# Patient Record
Sex: MALE | Race: WHITE | NOT HISPANIC OR LATINO
[De-identification: names, ages, dates, MRNs, and addresses within clinical notes are randomized per-mention and may not be internally consistent; named-entity substitution may affect disease eponyms.]

---

## 2018-06-12 PROBLEM — Z00.00 ENCOUNTER FOR PREVENTIVE HEALTH EXAMINATION: Status: ACTIVE | Noted: 2018-06-12

## 2018-06-14 ENCOUNTER — APPOINTMENT (OUTPATIENT)
Dept: SURGERY | Facility: CLINIC | Age: 48
End: 2018-06-14
Payer: COMMERCIAL

## 2018-06-14 VITALS
HEART RATE: 106 BPM | OXYGEN SATURATION: 98 % | HEIGHT: 66 IN | SYSTOLIC BLOOD PRESSURE: 170 MMHG | BODY MASS INDEX: 35.36 KG/M2 | WEIGHT: 220 LBS | DIASTOLIC BLOOD PRESSURE: 100 MMHG

## 2018-06-14 DIAGNOSIS — Z80.52 FAMILY HISTORY OF MALIGNANT NEOPLASM OF BLADDER: ICD-10-CM

## 2018-06-14 DIAGNOSIS — Z85.850 PERSONAL HISTORY OF MALIGNANT NEOPLASM OF THYROID: ICD-10-CM

## 2018-06-14 DIAGNOSIS — Z85.810 PERSONAL HISTORY OF MALIGNANT NEOPLASM OF TONGUE: ICD-10-CM

## 2018-06-14 DIAGNOSIS — Z80.8 FAMILY HISTORY OF MALIGNANT NEOPLASM OF OTHER ORGANS OR SYSTEMS: ICD-10-CM

## 2018-06-14 PROCEDURE — 99202 OFFICE O/P NEW SF 15 MIN: CPT

## 2018-07-03 ENCOUNTER — MESSAGE (OUTPATIENT)
Age: 48
End: 2018-07-03

## 2018-07-03 ENCOUNTER — APPOINTMENT (OUTPATIENT)
Dept: SURGERY | Facility: CLINIC | Age: 48
End: 2018-07-03

## 2019-11-18 ENCOUNTER — EMERGENCY (EMERGENCY)
Facility: HOSPITAL | Age: 49
LOS: 0 days | Discharge: LEFT BEFORE DISCHARGE | End: 2019-11-18
Attending: EMERGENCY MEDICINE | Admitting: EMERGENCY MEDICINE

## 2019-11-18 VITALS
DIASTOLIC BLOOD PRESSURE: 110 MMHG | RESPIRATION RATE: 18 BRPM | OXYGEN SATURATION: 98 % | HEART RATE: 99 BPM | TEMPERATURE: 97 F | SYSTOLIC BLOOD PRESSURE: 202 MMHG

## 2019-11-18 NOTE — ED ADULT TRIAGE NOTE - CHIEF COMPLAINT QUOTE
Pt feeling off balance and dizzy x few days. Pt newly prescribed BP medication, sent in by PMD for further evaluation.

## 2019-11-18 NOTE — ED ADULT NURSE NOTE - OBJECTIVE STATEMENT
Pt states got bit by a tic 2 weeks ago and was thinking "my bp was high due to that" " I saw my pmd and he did blood work due to my high bp and possible high bp from the tic bit. patient states has tingling in face and leg also, states " I do not want to be here all night I will come back tomorrow:

## 2019-11-22 DIAGNOSIS — R42 DIZZINESS AND GIDDINESS: ICD-10-CM

## 2020-08-11 ENCOUNTER — APPOINTMENT (OUTPATIENT)
Dept: SURGERY | Facility: CLINIC | Age: 50
End: 2020-08-11
Payer: COMMERCIAL

## 2020-08-11 VITALS
HEART RATE: 102 BPM | BODY MASS INDEX: 33.43 KG/M2 | HEIGHT: 66 IN | WEIGHT: 208 LBS | OXYGEN SATURATION: 97 % | TEMPERATURE: 97.1 F

## 2020-08-11 DIAGNOSIS — I10 ESSENTIAL (PRIMARY) HYPERTENSION: ICD-10-CM

## 2020-08-11 DIAGNOSIS — R10.31 RIGHT LOWER QUADRANT PAIN: ICD-10-CM

## 2020-08-11 DIAGNOSIS — Z86.39 PERSONAL HISTORY OF OTHER ENDOCRINE, NUTRITIONAL AND METABOLIC DISEASE: ICD-10-CM

## 2020-08-11 DIAGNOSIS — R10.33 PERIUMBILICAL PAIN: ICD-10-CM

## 2020-08-11 DIAGNOSIS — R10.32 LEFT LOWER QUADRANT PAIN: ICD-10-CM

## 2020-08-11 PROCEDURE — 99213 OFFICE O/P EST LOW 20 MIN: CPT

## 2020-08-11 RX ORDER — LEVOTHYROXINE SODIUM 0.05 MG/1
50 TABLET ORAL
Refills: 0 | Status: ACTIVE | COMMUNITY

## 2020-08-11 RX ORDER — LISINOPRIL 30 MG/1
TABLET ORAL
Refills: 0 | Status: ACTIVE | COMMUNITY

## 2020-08-11 RX ORDER — AMOXICILLIN 875 MG/1
875 TABLET, FILM COATED ORAL
Qty: 20 | Refills: 0 | Status: COMPLETED | COMMUNITY
Start: 2018-06-14 | End: 2020-08-11

## 2020-08-11 NOTE — ASSESSMENT
[FreeTextEntry1] : After examination patient was explained about hernia  again. He was explained about needing colonoscopy, patient was also explained about gallstones. Patient is asymptomatic from gallbladder point of view. Surgery for umbilical hernia explain in detail. Risks and  complication explained. Informed consent was obtained. Surgery to be scheduled.

## 2020-08-11 NOTE — PHYSICAL EXAM
[Abdominal Masses] : No abdominal masses [Alert] : alert [Abdomen Tenderness] : ~T ~M Abdominal tenderness [No Rash or Lesion] : No rash or lesion [Oriented to Person] : oriented to person [Oriented to Place] : oriented to place [Oriented to Time] : oriented to time [Calm] : calm [de-identified] : NAD [de-identified] : Nonreducible umbilical hernia which is tender  to touch [de-identified] : Nonreducible umbilical hernia

## 2020-08-11 NOTE — CONSULT LETTER
[Consult Letter:] : I had the pleasure of evaluating your patient, [unfilled]. [Dear  ___] : Dear  [unfilled], [Consult Closing:] : Thank you very much for allowing me to participate in the care of this patient.  If you have any questions, please do not hesitate to contact me. [Please see my note below.] : Please see my note below. [Sincerely,] : Sincerely, [FreeTextEntry3] : Mikel Keith MD, FACS\par [facility]\[place] [address]\par

## 2020-08-11 NOTE — HISTORY OF PRESENT ILLNESS
[de-identified] : Patient presents to the office with the history of symptomatic umbilical hernia. He was seen in the office two years ago and was advised to come back and scheduled the surgery. He has not been in the office for two years. Previous CAT scan report was also explained to the patient however patient does not remember the discussion two years ago.

## 2020-09-04 ENCOUNTER — APPOINTMENT (OUTPATIENT)
Dept: SURGERY | Facility: HOSPITAL | Age: 50
End: 2020-09-04

## 2020-09-10 ENCOUNTER — APPOINTMENT (OUTPATIENT)
Dept: SURGERY | Facility: CLINIC | Age: 50
End: 2020-09-10

## 2020-11-02 ENCOUNTER — OUTPATIENT (OUTPATIENT)
Dept: OUTPATIENT SERVICES | Facility: HOSPITAL | Age: 50
LOS: 1 days | Discharge: HOME | End: 2020-11-02
Payer: COMMERCIAL

## 2020-11-02 ENCOUNTER — RESULT REVIEW (OUTPATIENT)
Age: 50
End: 2020-11-02

## 2020-11-02 VITALS
HEART RATE: 102 BPM | TEMPERATURE: 99 F | RESPIRATION RATE: 20 BRPM | SYSTOLIC BLOOD PRESSURE: 170 MMHG | HEIGHT: 66 IN | OXYGEN SATURATION: 100 % | DIASTOLIC BLOOD PRESSURE: 80 MMHG | WEIGHT: 223.11 LBS

## 2020-11-02 DIAGNOSIS — Z98.890 OTHER SPECIFIED POSTPROCEDURAL STATES: Chronic | ICD-10-CM

## 2020-11-02 DIAGNOSIS — K42.9 UMBILICAL HERNIA WITHOUT OBSTRUCTION OR GANGRENE: ICD-10-CM

## 2020-11-02 DIAGNOSIS — Z01.818 ENCOUNTER FOR OTHER PREPROCEDURAL EXAMINATION: ICD-10-CM

## 2020-11-02 LAB
ALBUMIN SERPL ELPH-MCNC: 4.6 G/DL — SIGNIFICANT CHANGE UP (ref 3.5–5.2)
ALP SERPL-CCNC: 69 U/L — SIGNIFICANT CHANGE UP (ref 30–115)
ALT FLD-CCNC: 21 U/L — SIGNIFICANT CHANGE UP (ref 0–41)
ANION GAP SERPL CALC-SCNC: 13 MMOL/L — SIGNIFICANT CHANGE UP (ref 7–14)
APPEARANCE UR: CLEAR — SIGNIFICANT CHANGE UP
APTT BLD: 36.9 SEC — SIGNIFICANT CHANGE UP (ref 27–39.2)
AST SERPL-CCNC: 16 U/L — SIGNIFICANT CHANGE UP (ref 0–41)
BASOPHILS # BLD AUTO: 0.04 K/UL — SIGNIFICANT CHANGE UP (ref 0–0.2)
BASOPHILS NFR BLD AUTO: 0.6 % — SIGNIFICANT CHANGE UP (ref 0–1)
BILIRUB SERPL-MCNC: 0.2 MG/DL — SIGNIFICANT CHANGE UP (ref 0.2–1.2)
BILIRUB UR-MCNC: NEGATIVE — SIGNIFICANT CHANGE UP
BUN SERPL-MCNC: 15 MG/DL — SIGNIFICANT CHANGE UP (ref 10–20)
CALCIUM SERPL-MCNC: 9.8 MG/DL — SIGNIFICANT CHANGE UP (ref 8.5–10.1)
CHLORIDE SERPL-SCNC: 102 MMOL/L — SIGNIFICANT CHANGE UP (ref 98–110)
CO2 SERPL-SCNC: 27 MMOL/L — SIGNIFICANT CHANGE UP (ref 17–32)
COLOR SPEC: SIGNIFICANT CHANGE UP
CREAT SERPL-MCNC: 1.4 MG/DL — SIGNIFICANT CHANGE UP (ref 0.7–1.5)
DIFF PNL FLD: NEGATIVE — SIGNIFICANT CHANGE UP
EOSINOPHIL # BLD AUTO: 0.14 K/UL — SIGNIFICANT CHANGE UP (ref 0–0.7)
EOSINOPHIL NFR BLD AUTO: 2.2 % — SIGNIFICANT CHANGE UP (ref 0–8)
GLUCOSE SERPL-MCNC: 88 MG/DL — SIGNIFICANT CHANGE UP (ref 70–99)
GLUCOSE UR QL: NEGATIVE — SIGNIFICANT CHANGE UP
HCT VFR BLD CALC: 45.2 % — SIGNIFICANT CHANGE UP (ref 42–52)
HGB BLD-MCNC: 14.4 G/DL — SIGNIFICANT CHANGE UP (ref 14–18)
IMM GRANULOCYTES NFR BLD AUTO: 0.3 % — SIGNIFICANT CHANGE UP (ref 0.1–0.3)
INR BLD: 1.1 RATIO — SIGNIFICANT CHANGE UP (ref 0.65–1.3)
KETONES UR-MCNC: NEGATIVE — SIGNIFICANT CHANGE UP
LEUKOCYTE ESTERASE UR-ACNC: NEGATIVE — SIGNIFICANT CHANGE UP
LYMPHOCYTES # BLD AUTO: 1.28 K/UL — SIGNIFICANT CHANGE UP (ref 1.2–3.4)
LYMPHOCYTES # BLD AUTO: 19.8 % — LOW (ref 20.5–51.1)
MCHC RBC-ENTMCNC: 29.4 PG — SIGNIFICANT CHANGE UP (ref 27–31)
MCHC RBC-ENTMCNC: 31.9 G/DL — LOW (ref 32–37)
MCV RBC AUTO: 92.4 FL — SIGNIFICANT CHANGE UP (ref 80–94)
MONOCYTES # BLD AUTO: 0.54 K/UL — SIGNIFICANT CHANGE UP (ref 0.1–0.6)
MONOCYTES NFR BLD AUTO: 8.3 % — SIGNIFICANT CHANGE UP (ref 1.7–9.3)
NEUTROPHILS # BLD AUTO: 4.46 K/UL — SIGNIFICANT CHANGE UP (ref 1.4–6.5)
NEUTROPHILS NFR BLD AUTO: 68.8 % — SIGNIFICANT CHANGE UP (ref 42.2–75.2)
NITRITE UR-MCNC: NEGATIVE — SIGNIFICANT CHANGE UP
NRBC # BLD: 0 /100 WBCS — SIGNIFICANT CHANGE UP (ref 0–0)
PH UR: 6 — SIGNIFICANT CHANGE UP (ref 5–8)
PLATELET # BLD AUTO: 275 K/UL — SIGNIFICANT CHANGE UP (ref 130–400)
POTASSIUM SERPL-MCNC: 4.7 MMOL/L — SIGNIFICANT CHANGE UP (ref 3.5–5)
POTASSIUM SERPL-SCNC: 4.7 MMOL/L — SIGNIFICANT CHANGE UP (ref 3.5–5)
PROT SERPL-MCNC: 7 G/DL — SIGNIFICANT CHANGE UP (ref 6–8)
PROT UR-MCNC: SIGNIFICANT CHANGE UP
PROTHROM AB SERPL-ACNC: 12.6 SEC — SIGNIFICANT CHANGE UP (ref 9.95–12.87)
RBC # BLD: 4.89 M/UL — SIGNIFICANT CHANGE UP (ref 4.7–6.1)
RBC # FLD: 12.6 % — SIGNIFICANT CHANGE UP (ref 11.5–14.5)
SODIUM SERPL-SCNC: 142 MMOL/L — SIGNIFICANT CHANGE UP (ref 135–146)
SP GR SPEC: 1.02 — SIGNIFICANT CHANGE UP (ref 1.01–1.03)
UROBILINOGEN FLD QL: SIGNIFICANT CHANGE UP
WBC # BLD: 6.48 K/UL — SIGNIFICANT CHANGE UP (ref 4.8–10.8)
WBC # FLD AUTO: 6.48 K/UL — SIGNIFICANT CHANGE UP (ref 4.8–10.8)

## 2020-11-02 PROCEDURE — 93010 ELECTROCARDIOGRAM REPORT: CPT

## 2020-11-02 PROCEDURE — 71046 X-RAY EXAM CHEST 2 VIEWS: CPT | Mod: 26

## 2020-11-02 NOTE — H&P PST ADULT - NSICDXPASTMEDICALHX_GEN_ALL_CORE_FT
PAST MEDICAL HISTORY:  H/O cerebral aneurysm repair     HTN (hypertension)     Hypothyroidism     Thyroid cancer     Tongue cancer      PAST MEDICAL HISTORY:  H/O cerebral aneurysm repair 2019    HTN (hypertension)     Hypothyroidism     Thyroid cancer 2015    Tongue cancer 2015( last chemo/ radiation 2015)

## 2020-11-02 NOTE — H&P PST ADULT - HISTORY OF PRESENT ILLNESS
51 y/o male scheduled for umbilical hernia repair with mesh  pt reports that hernia has been there for aprox 20 yrs has grown is size and is uncomfortable  reports no c/o cp,sob,palpitations,cough or dysuria  1-2 fos without sob      denies any exposure to COVID-19 advised to self quarantine until after surg    Anesthesia Alert  NO--Difficult Airway  NO--History of neck surgery or radiation  NO--Limited ROM of neck  NO--History of Malignant hyperthermia  NO--Personal or family history of Pseudocholinesterase deficiency  NO--Prior Anesthesia Complication  NO--Latex Allergy  NO--Loose teeth  NO--History of Rheumatoid Arthritis  NO--SOY  NO--Other_____

## 2020-11-08 ENCOUNTER — OUTPATIENT (OUTPATIENT)
Dept: OUTPATIENT SERVICES | Facility: HOSPITAL | Age: 50
LOS: 1 days | Discharge: HOME | End: 2020-11-08

## 2020-11-08 ENCOUNTER — LABORATORY RESULT (OUTPATIENT)
Age: 50
End: 2020-11-08

## 2020-11-08 DIAGNOSIS — Z98.890 OTHER SPECIFIED POSTPROCEDURAL STATES: Chronic | ICD-10-CM

## 2020-11-08 DIAGNOSIS — Z11.59 ENCOUNTER FOR SCREENING FOR OTHER VIRAL DISEASES: ICD-10-CM

## 2020-11-08 PROBLEM — C02.9 MALIGNANT NEOPLASM OF TONGUE, UNSPECIFIED: Chronic | Status: ACTIVE | Noted: 2020-11-02

## 2020-11-08 PROBLEM — I10 ESSENTIAL (PRIMARY) HYPERTENSION: Chronic | Status: ACTIVE | Noted: 2020-11-02

## 2020-11-08 PROBLEM — C73 MALIGNANT NEOPLASM OF THYROID GLAND: Chronic | Status: ACTIVE | Noted: 2020-11-02

## 2020-11-08 PROBLEM — E03.9 HYPOTHYROIDISM, UNSPECIFIED: Chronic | Status: ACTIVE | Noted: 2020-11-02

## 2020-11-10 NOTE — ASU PATIENT PROFILE, ADULT - PMH
H/O cerebral aneurysm repair  2019  HTN (hypertension)    Hypothyroidism    Thyroid cancer  2015  Tongue cancer  2015( last chemo/ radiation 2015)

## 2020-11-10 NOTE — ASU PATIENT PROFILE, ADULT - PSH
S/P cerebral aneurysm repair     H/O partial thyroidectomy    S/P cerebral aneurysm repair    Surgical history unknown  tumor removal/tongue/cavernoma brain removed

## 2020-11-11 ENCOUNTER — OUTPATIENT (OUTPATIENT)
Dept: OUTPATIENT SERVICES | Facility: HOSPITAL | Age: 50
LOS: 1 days | Discharge: HOME | End: 2020-11-11
Payer: COMMERCIAL

## 2020-11-11 ENCOUNTER — APPOINTMENT (OUTPATIENT)
Dept: SURGERY | Facility: HOSPITAL | Age: 50
End: 2020-11-11

## 2020-11-11 VITALS
HEART RATE: 108 BPM | RESPIRATION RATE: 18 BRPM | DIASTOLIC BLOOD PRESSURE: 109 MMHG | WEIGHT: 218.04 LBS | TEMPERATURE: 99 F | OXYGEN SATURATION: 97 % | HEIGHT: 66 IN | SYSTOLIC BLOOD PRESSURE: 148 MMHG

## 2020-11-11 VITALS — RESPIRATION RATE: 16 BRPM | HEART RATE: 86 BPM | DIASTOLIC BLOOD PRESSURE: 78 MMHG | SYSTOLIC BLOOD PRESSURE: 121 MMHG

## 2020-11-11 DIAGNOSIS — Z98.890 OTHER SPECIFIED POSTPROCEDURAL STATES: Chronic | ICD-10-CM

## 2020-11-11 DIAGNOSIS — Z90.09 ACQUIRED ABSENCE OF OTHER PART OF HEAD AND NECK: Chronic | ICD-10-CM

## 2020-11-11 DIAGNOSIS — Z78.9 OTHER SPECIFIED HEALTH STATUS: Chronic | ICD-10-CM

## 2020-11-11 PROCEDURE — 49585: CPT

## 2020-11-11 RX ORDER — LABETALOL HCL 100 MG
1 TABLET ORAL
Qty: 0 | Refills: 0 | DISCHARGE

## 2020-11-11 RX ORDER — HYDROMORPHONE HYDROCHLORIDE 2 MG/ML
1 INJECTION INTRAMUSCULAR; INTRAVENOUS; SUBCUTANEOUS
Refills: 0 | Status: DISCONTINUED | OUTPATIENT
Start: 2020-11-11 | End: 2020-11-11

## 2020-11-11 RX ORDER — HYDROMORPHONE HYDROCHLORIDE 2 MG/ML
0.5 INJECTION INTRAMUSCULAR; INTRAVENOUS; SUBCUTANEOUS
Refills: 0 | Status: DISCONTINUED | OUTPATIENT
Start: 2020-11-11 | End: 2020-11-11

## 2020-11-11 RX ORDER — ONDANSETRON 8 MG/1
4 TABLET, FILM COATED ORAL ONCE
Refills: 0 | Status: DISCONTINUED | OUTPATIENT
Start: 2020-11-11 | End: 2020-11-25

## 2020-11-11 RX ORDER — SODIUM CHLORIDE 9 MG/ML
1000 INJECTION, SOLUTION INTRAVENOUS
Refills: 0 | Status: DISCONTINUED | OUTPATIENT
Start: 2020-11-11 | End: 2020-11-25

## 2020-11-11 RX ORDER — LEVOTHYROXINE SODIUM 125 MCG
1 TABLET ORAL
Qty: 0 | Refills: 0 | DISCHARGE

## 2020-11-11 NOTE — PRE-ANESTHESIA EVALUATION ADULT - NSANTHOSAYNRD_GEN_A_CORE
No. SOY screening performed.  STOP BANG Legend: 0-2 = LOW Risk; 3-4 = INTERMEDIATE Risk; 5-8 = HIGH Risk/denies

## 2020-11-11 NOTE — ASU DISCHARGE PLAN (ADULT/PEDIATRIC) - CARE PROVIDER_API CALL
Mikel Keith  General Surgery  [address] [place] [address]  Phone: [phone]  Fax: [phone]  Follow Up Time: 1 week

## 2020-11-11 NOTE — BRIEF OPERATIVE NOTE - NSICDXBRIEFPROCEDURE_GEN_ALL_CORE_FT
PROCEDURES:  Herniorrhaphy of umbilical hernia in patient 5 years of age or older 11-Nov-2020 14:10:55  Mikel Keith

## 2020-11-16 DIAGNOSIS — K42.9 UMBILICAL HERNIA WITHOUT OBSTRUCTION OR GANGRENE: ICD-10-CM

## 2020-11-16 DIAGNOSIS — Z85.810 PERSONAL HISTORY OF MALIGNANT NEOPLASM OF TONGUE: ICD-10-CM

## 2020-11-16 DIAGNOSIS — I10 ESSENTIAL (PRIMARY) HYPERTENSION: ICD-10-CM

## 2020-11-16 DIAGNOSIS — Z85.850 PERSONAL HISTORY OF MALIGNANT NEOPLASM OF THYROID: ICD-10-CM

## 2020-11-16 DIAGNOSIS — E03.9 HYPOTHYROIDISM, UNSPECIFIED: ICD-10-CM

## 2020-11-19 ENCOUNTER — APPOINTMENT (OUTPATIENT)
Dept: SURGERY | Facility: CLINIC | Age: 50
End: 2020-11-19
Payer: COMMERCIAL

## 2020-11-19 DIAGNOSIS — K42.9 UMBILICAL HERNIA W/OUT OBSTRUCTION OR GANGRENE: ICD-10-CM

## 2020-11-19 PROCEDURE — 99024 POSTOP FOLLOW-UP VISIT: CPT

## 2021-01-14 ENCOUNTER — INPATIENT (INPATIENT)
Facility: HOSPITAL | Age: 51
LOS: 0 days | Discharge: HOME | End: 2021-01-15
Attending: INTERNAL MEDICINE | Admitting: INTERNAL MEDICINE
Payer: COMMERCIAL

## 2021-01-14 VITALS
TEMPERATURE: 99 F | HEIGHT: 66 IN | SYSTOLIC BLOOD PRESSURE: 190 MMHG | WEIGHT: 212.97 LBS | RESPIRATION RATE: 16 BRPM | HEART RATE: 106 BPM | DIASTOLIC BLOOD PRESSURE: 104 MMHG | OXYGEN SATURATION: 97 %

## 2021-01-14 DIAGNOSIS — Z90.09 ACQUIRED ABSENCE OF OTHER PART OF HEAD AND NECK: Chronic | ICD-10-CM

## 2021-01-14 DIAGNOSIS — Z98.890 OTHER SPECIFIED POSTPROCEDURAL STATES: Chronic | ICD-10-CM

## 2021-01-14 DIAGNOSIS — Z78.9 OTHER SPECIFIED HEALTH STATUS: Chronic | ICD-10-CM

## 2021-01-14 LAB
ALBUMIN SERPL ELPH-MCNC: 4.3 G/DL — SIGNIFICANT CHANGE UP (ref 3.5–5.2)
ALP SERPL-CCNC: 101 U/L — SIGNIFICANT CHANGE UP (ref 30–115)
ALT FLD-CCNC: 36 U/L — SIGNIFICANT CHANGE UP (ref 0–41)
ANION GAP SERPL CALC-SCNC: 12 MMOL/L — SIGNIFICANT CHANGE UP (ref 7–14)
APPEARANCE UR: CLEAR — SIGNIFICANT CHANGE UP
AST SERPL-CCNC: 22 U/L — SIGNIFICANT CHANGE UP (ref 0–41)
BACTERIA # UR AUTO: SIGNIFICANT CHANGE UP /HPF
BASOPHILS # BLD AUTO: 0.04 K/UL — SIGNIFICANT CHANGE UP (ref 0–0.2)
BASOPHILS NFR BLD AUTO: 0.4 % — SIGNIFICANT CHANGE UP (ref 0–1)
BILIRUB DIRECT SERPL-MCNC: <0.2 MG/DL — SIGNIFICANT CHANGE UP (ref 0–0.2)
BILIRUB INDIRECT FLD-MCNC: >0.1 MG/DL — LOW (ref 0.2–1.2)
BILIRUB SERPL-MCNC: 0.3 MG/DL — SIGNIFICANT CHANGE UP (ref 0.2–1.2)
BILIRUB UR-MCNC: NEGATIVE — SIGNIFICANT CHANGE UP
BUN SERPL-MCNC: 29 MG/DL — HIGH (ref 10–20)
CALCIUM SERPL-MCNC: 9.1 MG/DL — SIGNIFICANT CHANGE UP (ref 8.5–10.1)
CHLORIDE SERPL-SCNC: 101 MMOL/L — SIGNIFICANT CHANGE UP (ref 98–110)
CO2 SERPL-SCNC: 25 MMOL/L — SIGNIFICANT CHANGE UP (ref 17–32)
COLOR SPEC: YELLOW — SIGNIFICANT CHANGE UP
CREAT SERPL-MCNC: 2.3 MG/DL — HIGH (ref 0.7–1.5)
DIFF PNL FLD: ABNORMAL
EOSINOPHIL # BLD AUTO: 0.16 K/UL — SIGNIFICANT CHANGE UP (ref 0–0.7)
EOSINOPHIL NFR BLD AUTO: 1.7 % — SIGNIFICANT CHANGE UP (ref 0–8)
EPI CELLS # UR: ABNORMAL /HPF
GLUCOSE SERPL-MCNC: 124 MG/DL — HIGH (ref 70–99)
GLUCOSE UR QL: NEGATIVE MG/DL — SIGNIFICANT CHANGE UP
HCT VFR BLD CALC: 41.8 % — LOW (ref 42–52)
HGB BLD-MCNC: 13.6 G/DL — LOW (ref 14–18)
IMM GRANULOCYTES NFR BLD AUTO: 0.4 % — HIGH (ref 0.1–0.3)
KETONES UR-MCNC: NEGATIVE — SIGNIFICANT CHANGE UP
LACTATE SERPL-SCNC: 0.6 MMOL/L — LOW (ref 0.7–2)
LEUKOCYTE ESTERASE UR-ACNC: NEGATIVE — SIGNIFICANT CHANGE UP
LIDOCAIN IGE QN: 47 U/L — SIGNIFICANT CHANGE UP (ref 7–60)
LYMPHOCYTES # BLD AUTO: 1.01 K/UL — LOW (ref 1.2–3.4)
LYMPHOCYTES # BLD AUTO: 10.8 % — LOW (ref 20.5–51.1)
MCHC RBC-ENTMCNC: 28.6 PG — SIGNIFICANT CHANGE UP (ref 27–31)
MCHC RBC-ENTMCNC: 32.5 G/DL — SIGNIFICANT CHANGE UP (ref 32–37)
MCV RBC AUTO: 87.8 FL — SIGNIFICANT CHANGE UP (ref 80–94)
MONOCYTES # BLD AUTO: 1.08 K/UL — HIGH (ref 0.1–0.6)
MONOCYTES NFR BLD AUTO: 11.5 % — HIGH (ref 1.7–9.3)
NEUTROPHILS # BLD AUTO: 7.03 K/UL — HIGH (ref 1.4–6.5)
NEUTROPHILS NFR BLD AUTO: 75.2 % — SIGNIFICANT CHANGE UP (ref 42.2–75.2)
NITRITE UR-MCNC: NEGATIVE — SIGNIFICANT CHANGE UP
NRBC # BLD: 0 /100 WBCS — SIGNIFICANT CHANGE UP (ref 0–0)
PH UR: 5.5 — SIGNIFICANT CHANGE UP (ref 5–8)
PLATELET # BLD AUTO: 314 K/UL — SIGNIFICANT CHANGE UP (ref 130–400)
POTASSIUM SERPL-MCNC: 4.2 MMOL/L — SIGNIFICANT CHANGE UP (ref 3.5–5)
POTASSIUM SERPL-SCNC: 4.2 MMOL/L — SIGNIFICANT CHANGE UP (ref 3.5–5)
PROT SERPL-MCNC: 6.8 G/DL — SIGNIFICANT CHANGE UP (ref 6–8)
PROT UR-MCNC: 100 MG/DL
RBC # BLD: 4.76 M/UL — SIGNIFICANT CHANGE UP (ref 4.7–6.1)
RBC # FLD: 12 % — SIGNIFICANT CHANGE UP (ref 11.5–14.5)
RBC CASTS # UR COMP ASSIST: ABNORMAL /HPF
SODIUM SERPL-SCNC: 138 MMOL/L — SIGNIFICANT CHANGE UP (ref 135–146)
SP GR SPEC: >=1.03 (ref 1.01–1.03)
UROBILINOGEN FLD QL: 0.2 MG/DL — SIGNIFICANT CHANGE UP (ref 0.2–0.2)
WBC # BLD: 9.36 K/UL — SIGNIFICANT CHANGE UP (ref 4.8–10.8)
WBC # FLD AUTO: 9.36 K/UL — SIGNIFICANT CHANGE UP (ref 4.8–10.8)
WBC UR QL: SIGNIFICANT CHANGE UP /HPF

## 2021-01-14 PROCEDURE — 99285 EMERGENCY DEPT VISIT HI MDM: CPT

## 2021-01-14 RX ORDER — SODIUM CHLORIDE 9 MG/ML
1000 INJECTION, SOLUTION INTRAVENOUS ONCE
Refills: 0 | Status: COMPLETED | OUTPATIENT
Start: 2021-01-14 | End: 2021-01-14

## 2021-01-14 RX ORDER — ONDANSETRON 8 MG/1
4 TABLET, FILM COATED ORAL ONCE
Refills: 0 | Status: COMPLETED | OUTPATIENT
Start: 2021-01-14 | End: 2021-01-14

## 2021-01-14 RX ADMIN — SODIUM CHLORIDE 1000 MILLILITER(S): 9 INJECTION, SOLUTION INTRAVENOUS at 23:02

## 2021-01-14 RX ADMIN — ONDANSETRON 4 MILLIGRAM(S): 8 TABLET, FILM COATED ORAL at 23:02

## 2021-01-14 NOTE — ED ADULT NURSE NOTE - PSH
H/O partial thyroidectomy    S/P cerebral aneurysm repair    Surgical history unknown  tumor removal/tongue/cavernoma brain removed

## 2021-01-14 NOTE — ED ADULT NURSE NOTE - NSIMPLEMENTINTERV_GEN_ALL_ED
Implemented All Universal Safety Interventions:  [place] to call system. Call bell, personal items and telephone within reach. Instruct patient to call for assistance. Room bathroom lighting operational. Non-slip footwear when patient is off stretcher. Physically safe environment: no spills, clutter or unnecessary equipment. Stretcher in lowest position, wheels locked, appropriate side rails in place.

## 2021-01-15 ENCOUNTER — TRANSCRIPTION ENCOUNTER (OUTPATIENT)
Age: 51
End: 2021-01-15

## 2021-01-15 VITALS
HEART RATE: 78 BPM | DIASTOLIC BLOOD PRESSURE: 97 MMHG | TEMPERATURE: 96 F | RESPIRATION RATE: 16 BRPM | SYSTOLIC BLOOD PRESSURE: 159 MMHG

## 2021-01-15 DIAGNOSIS — N20.0 CALCULUS OF KIDNEY: ICD-10-CM

## 2021-01-15 LAB
ANION GAP SERPL CALC-SCNC: 10 MMOL/L — SIGNIFICANT CHANGE UP (ref 7–14)
ANION GAP SERPL CALC-SCNC: 10 MMOL/L — SIGNIFICANT CHANGE UP (ref 7–14)
APTT BLD: 34.7 SEC — SIGNIFICANT CHANGE UP (ref 27–39.2)
BUN SERPL-MCNC: 23 MG/DL — HIGH (ref 10–20)
BUN SERPL-MCNC: 27 MG/DL — HIGH (ref 10–20)
CALCIUM SERPL-MCNC: 8.8 MG/DL — SIGNIFICANT CHANGE UP (ref 8.5–10.1)
CALCIUM SERPL-MCNC: 9.1 MG/DL — SIGNIFICANT CHANGE UP (ref 8.5–10.1)
CHLORIDE SERPL-SCNC: 101 MMOL/L — SIGNIFICANT CHANGE UP (ref 98–110)
CHLORIDE SERPL-SCNC: 103 MMOL/L — SIGNIFICANT CHANGE UP (ref 98–110)
CO2 SERPL-SCNC: 25 MMOL/L — SIGNIFICANT CHANGE UP (ref 17–32)
CO2 SERPL-SCNC: 26 MMOL/L — SIGNIFICANT CHANGE UP (ref 17–32)
CREAT SERPL-MCNC: 2.3 MG/DL — HIGH (ref 0.7–1.5)
CREAT SERPL-MCNC: 2.3 MG/DL — HIGH (ref 0.7–1.5)
GLUCOSE SERPL-MCNC: 100 MG/DL — HIGH (ref 70–99)
GLUCOSE SERPL-MCNC: 92 MG/DL — SIGNIFICANT CHANGE UP (ref 70–99)
HCT VFR BLD CALC: 39.2 % — LOW (ref 42–52)
HGB BLD-MCNC: 12.7 G/DL — LOW (ref 14–18)
INR BLD: 1.26 RATIO — SIGNIFICANT CHANGE UP (ref 0.65–1.3)
MCHC RBC-ENTMCNC: 28.7 PG — SIGNIFICANT CHANGE UP (ref 27–31)
MCHC RBC-ENTMCNC: 32.4 G/DL — SIGNIFICANT CHANGE UP (ref 32–37)
MCV RBC AUTO: 88.7 FL — SIGNIFICANT CHANGE UP (ref 80–94)
NRBC # BLD: 0 /100 WBCS — SIGNIFICANT CHANGE UP (ref 0–0)
PLATELET # BLD AUTO: 256 K/UL — SIGNIFICANT CHANGE UP (ref 130–400)
POTASSIUM SERPL-MCNC: 4.4 MMOL/L — SIGNIFICANT CHANGE UP (ref 3.5–5)
POTASSIUM SERPL-MCNC: 4.6 MMOL/L — SIGNIFICANT CHANGE UP (ref 3.5–5)
POTASSIUM SERPL-SCNC: 4.4 MMOL/L — SIGNIFICANT CHANGE UP (ref 3.5–5)
POTASSIUM SERPL-SCNC: 4.6 MMOL/L — SIGNIFICANT CHANGE UP (ref 3.5–5)
PROTHROM AB SERPL-ACNC: 14.5 SEC — HIGH (ref 9.95–12.87)
RAPID RVP RESULT: SIGNIFICANT CHANGE UP
RBC # BLD: 4.42 M/UL — LOW (ref 4.7–6.1)
RBC # FLD: 11.9 % — SIGNIFICANT CHANGE UP (ref 11.5–14.5)
SARS-COV-2 RNA SPEC QL NAA+PROBE: SIGNIFICANT CHANGE UP
SODIUM SERPL-SCNC: 137 MMOL/L — SIGNIFICANT CHANGE UP (ref 135–146)
SODIUM SERPL-SCNC: 138 MMOL/L — SIGNIFICANT CHANGE UP (ref 135–146)
WBC # BLD: 6.18 K/UL — SIGNIFICANT CHANGE UP (ref 4.8–10.8)
WBC # FLD AUTO: 6.18 K/UL — SIGNIFICANT CHANGE UP (ref 4.8–10.8)

## 2021-01-15 PROCEDURE — 99222 1ST HOSP IP/OBS MODERATE 55: CPT

## 2021-01-15 PROCEDURE — 74177 CT ABD & PELVIS W/CONTRAST: CPT | Mod: 26

## 2021-01-15 PROCEDURE — 99223 1ST HOSP IP/OBS HIGH 75: CPT

## 2021-01-15 PROCEDURE — 99238 HOSP IP/OBS DSCHRG MGMT 30/<: CPT

## 2021-01-15 RX ORDER — SODIUM CHLORIDE 9 MG/ML
1000 INJECTION INTRAMUSCULAR; INTRAVENOUS; SUBCUTANEOUS
Refills: 0 | Status: DISCONTINUED | OUTPATIENT
Start: 2021-01-15 | End: 2021-01-15

## 2021-01-15 RX ORDER — TAMSULOSIN HYDROCHLORIDE 0.4 MG/1
0.4 CAPSULE ORAL AT BEDTIME
Refills: 0 | Status: DISCONTINUED | OUTPATIENT
Start: 2021-01-15 | End: 2021-01-15

## 2021-01-15 RX ORDER — LEVOTHYROXINE SODIUM 125 MCG
50 TABLET ORAL DAILY
Refills: 0 | Status: DISCONTINUED | OUTPATIENT
Start: 2021-01-15 | End: 2021-01-15

## 2021-01-15 RX ORDER — TAMSULOSIN HYDROCHLORIDE 0.4 MG/1
1 CAPSULE ORAL
Qty: 30 | Refills: 0
Start: 2021-01-15 | End: 2021-02-13

## 2021-01-15 RX ORDER — TAMSULOSIN HYDROCHLORIDE 0.4 MG/1
0.8 CAPSULE ORAL ONCE
Refills: 0 | Status: COMPLETED | OUTPATIENT
Start: 2021-01-15 | End: 2021-01-15

## 2021-01-15 RX ORDER — HEPARIN SODIUM 5000 [USP'U]/ML
5000 INJECTION INTRAVENOUS; SUBCUTANEOUS EVERY 12 HOURS
Refills: 0 | Status: DISCONTINUED | OUTPATIENT
Start: 2021-01-15 | End: 2021-01-15

## 2021-01-15 RX ORDER — LABETALOL HCL 100 MG
100 TABLET ORAL
Refills: 0 | Status: DISCONTINUED | OUTPATIENT
Start: 2021-01-15 | End: 2021-01-15

## 2021-01-15 RX ORDER — TAMSULOSIN HYDROCHLORIDE 0.4 MG/1
1 CAPSULE ORAL
Qty: 0 | Refills: 0 | DISCHARGE
Start: 2021-01-15

## 2021-01-15 RX ORDER — OXYCODONE AND ACETAMINOPHEN 5; 325 MG/1; MG/1
1 TABLET ORAL EVERY 6 HOURS
Refills: 0 | Status: DISCONTINUED | OUTPATIENT
Start: 2021-01-15 | End: 2021-01-15

## 2021-01-15 RX ORDER — LABETALOL HCL 100 MG
100 TABLET ORAL ONCE
Refills: 0 | Status: COMPLETED | OUTPATIENT
Start: 2021-01-15 | End: 2021-01-15

## 2021-01-15 RX ORDER — SODIUM CHLORIDE 9 MG/ML
1000 INJECTION, SOLUTION INTRAVENOUS ONCE
Refills: 0 | Status: COMPLETED | OUTPATIENT
Start: 2021-01-15 | End: 2021-01-15

## 2021-01-15 RX ADMIN — Medication 100 MILLIGRAM(S): at 01:29

## 2021-01-15 RX ADMIN — SODIUM CHLORIDE 100 MILLILITER(S): 9 INJECTION INTRAMUSCULAR; INTRAVENOUS; SUBCUTANEOUS at 09:50

## 2021-01-15 RX ADMIN — OXYCODONE AND ACETAMINOPHEN 1 TABLET(S): 5; 325 TABLET ORAL at 06:37

## 2021-01-15 RX ADMIN — TAMSULOSIN HYDROCHLORIDE 0.8 MILLIGRAM(S): 0.4 CAPSULE ORAL at 01:29

## 2021-01-15 RX ADMIN — Medication 100 MILLIGRAM(S): at 06:32

## 2021-01-15 RX ADMIN — Medication 50 MICROGRAM(S): at 06:32

## 2021-01-15 RX ADMIN — SODIUM CHLORIDE 100 MILLILITER(S): 9 INJECTION INTRAMUSCULAR; INTRAVENOUS; SUBCUTANEOUS at 06:32

## 2021-01-15 RX ADMIN — SODIUM CHLORIDE 1000 MILLILITER(S): 9 INJECTION, SOLUTION INTRAVENOUS at 01:30

## 2021-01-15 NOTE — DISCHARGE NOTE PROVIDER - HOSPITAL COURSE
Mr Chavez Callejas is a 50M with hx of HTN, thyroid CA s/p thyroidectomy with resultant hypothyroidism (2015), tongue cancer s/p chemo/radiation (2015), cerebral aneurysm repair who presented 1/15 with right sided flank pain found to have 3mm right obstructing renal stone with mild right hydronephrosis.    # Right hydroureteronephrosis secondary to right-sided ureteral calculus -- CT Abd: Obstructing right-sided ureteral calculus (3 x 3 x 3 mm) at the level of the right ureterovesical junction, resulting in mild right hydroureteronephrosis. Was given IVF and started on Flomax. Urology consulted. Patient was requesting more conservative management if possible. Given that stone is 3mm Dr. Calhoun was okay with continuing conservative management rather than placing stent. Creatinine remained stable at 2.3. Patient reported improvement in pain and urine flow. Discussed with Urology who cleared him for discharge today. He will have follow up with Dr. Calhoun in 1 week (Thursday 1/21) with BMP in clinic. He will be given script to have renal US done on Monday 1/18. He will continue flomax on discharge.       continue all other home medications

## 2021-01-15 NOTE — ED PROVIDER NOTE - CLINICAL SUMMARY MEDICAL DECISION MAKING FREE TEXT BOX
I personally evaluated the patient. I reviewed the Resident’s or Physician Assistant’s note (as assigned above), and agree with the findings and plan except as documented in my note. Patient evaluated for flank pain. Labs, ua, CT performed in ED. Creatinine elevated. Urology consulted, recommending repeat BMP after fluid resuscitation. No improvement in renal function on repeat BMP. Patient admitted to hospital for further evaluation and treatment.

## 2021-01-15 NOTE — H&P ADULT - NSICDXPASTSURGICALHX_GEN_ALL_CORE_FT
PAST SURGICAL HISTORY:  H/O partial thyroidectomy     S/P cerebral aneurysm repair     Surgical history unknown tumor removal/tongue/cavernoma brain removed

## 2021-01-15 NOTE — DISCHARGE NOTE PROVIDER - NSDCMRMEDTOKEN_GEN_ALL_CORE_FT
labetalol 100 mg oral tablet: 1 tab(s) orally 2 times a day  levothyroxine 50 mcg (0.05 mg) oral capsule: 1 cap(s) orally once a day  oxycodone-acetaminophen 5 mg-325 mg oral tablet: 1 tab(s) orally every 6 hours, As Needed MDD:4   tamsulosin 0.4 mg oral capsule: 1 cap(s) orally once a day (at bedtime)   Flomax 0.4 mg oral capsule: 1 cap(s) orally once a day   labetalol 100 mg oral tablet: 1 tab(s) orally 2 times a day  levothyroxine 50 mcg (0.05 mg) oral capsule: 1 cap(s) orally once a day  oxycodone-acetaminophen 5 mg-325 mg oral tablet: 1 tab(s) orally every 6 hours, As Needed MDD:4   tamsulosin 0.4 mg oral capsule: 1 cap(s) orally once a day (at bedtime)

## 2021-01-15 NOTE — H&P ADULT - NSHPLABSRESULTS_GEN_ALL_CORE
CT Abd: Obstructing right-sided ureteral calculus (3 x 3 x 3 mm) at the level of the right ureterovesical junction, resulting in mild right hydroureteronephrosis.

## 2021-01-15 NOTE — DISCHARGE NOTE PROVIDER - NSDCCPCAREPLAN_GEN_ALL_CORE_FT
PRINCIPAL DISCHARGE DIAGNOSIS  Diagnosis: Kidney stone  Assessment and Plan of Treatment: Obstructing renal stone with mild right hydronephrosis      SECONDARY DISCHARGE DIAGNOSES  Diagnosis: Acute kidney injury  Assessment and Plan of Treatment:      PRINCIPAL DISCHARGE DIAGNOSIS  Diagnosis: Kidney stone  Assessment and Plan of Treatment: Obstructing renal stone with mild right hydronephrosis. Follow up with Dr Calhoun on 1/21/21. Please have renal bladder ultrasound done on 1/18/21 or 1/19/21. Return to hospital if you have a temperature greater then 100.4.      SECONDARY DISCHARGE DIAGNOSES  Diagnosis: Acute kidney injury  Assessment and Plan of Treatment: Blood work will be repeated at Dr Calhoun office on 1/21/21. Drink plenty of fluids

## 2021-01-15 NOTE — ED PROVIDER NOTE - NS ED ROS FT
Review of Systems:  	•	CONSTITUTIONAL: no fever, no diaphoresis, no chills  	•	SKIN: no rash  	•	HEMATOLOGIC: no bleeding, no bruising  	•	EYES: no eye pain, no blurry vision  	•	ENT: no sore throat  	•	RESPIRATORY: no shortness of breath, no cough  	•	CARDIAC: no chest pain, no palpitations  	•	GI: +R flank pain, +nausea. no vomiting, no diarrhea  	•	GENITO-URINARY: +urgency, no dysuria, no hematuria   	•	MUSCULOSKELETAL: no joint paint, no swelling, no redness  	•	NEUROLOGIC: no weakness, no headache

## 2021-01-15 NOTE — CONSULT NOTE ADULT - ATTENDING COMMENTS
pt seen and examined afternoon 1/15/21. no longer has pain. he believes he passed stone seen in toilet which he flushed. though he has CRISTIN ,his electrolyes are normal and he wishes trial of passage, w close monitoringof bmp as outpatient. he will fu w me next week early . rbus prior.  ER precautions given pt seen and examined afternoon 1/15/21. ct ap I mages visualized show small uvj stone w mild hydro.  no longer has pain. he believes he passed stone seen in toilet which he flushed. though he has CRISTIN ,his electrolyes are normal and he wishes trial of passage, w close monitoringof bmp as outpatient. he will fu w me next week early . rbus prior.  ER precautions given

## 2021-01-15 NOTE — DISCHARGE NOTE PROVIDER - CARE PROVIDER_API CALL
Timi Calhoun)  Urology  [address] [place] [address], [address]  [place] [address]  Phone: [phone]  Fax: [phone]  Scheduled Appointment: 01/21/2021

## 2021-01-15 NOTE — CONSULT NOTE ADULT - ASSESSMENT
50 with hx of HTN, thyroid ca, cerebral aneurysm repair, partial thyroidectomy, tongue ca, umbilical hernia repair consulted for kidney stone.     WBC=9.36, BUN/CR=29/2.3, T-max=99.2  CT A/P: Showed; Obstructing right-sided ureteral calculus (3 x 3 x 3 mm (500 HU)) at the level of the right ureterovesicular junction, resulting in mild right hydroureteronephrosis.

## 2021-01-15 NOTE — ED PROVIDER NOTE - PROGRESS NOTE DETAILS
myra: patient with CRISTIN today in ED, aware of elevated creatinine, patient to receive CT with IV contrast. myra: urology aware.

## 2021-01-15 NOTE — H&P ADULT - CLICK TO LAUNCH ORM
----- Message from Joselin Souza MD sent at 4/11/2018 10:42 AM CDT -----  Tacrolimus level is still high decrease to 4 mg twice a day and repeat labs next week.  
Dr. Souza reviewed your labs.  Called patient to let her know that labs look good, but Prograf level still to high.  Please decrease Prograf to 4 mg twice a day and repeat on Monday, left voicemail.  
.

## 2021-01-15 NOTE — DISCHARGE NOTE PROVIDER - NSDCFUADDINST_GEN_ALL_CORE_FT
If you develop worsening abdominal pain, fevers, chills, difficulty urinating, blood in urine, then call your Urologist and return to the Emergency Department.

## 2021-01-15 NOTE — ED PROVIDER NOTE - OBJECTIVE STATEMENT
50 year old male, past medical history htn, thyroid ca, who presents with right flank pain x3 days. patient endorses intermittent episodes of R flank pain radiating to groin, with associated urgency. 50 year old male, past medical history htn, thyroid ca, who presents with right flank pain x3 days. patient endorses intermittent episodes of R flank pain radiating to groin. patient reports associated urgency and nausea, no vomiting. no f/c, chest pain, shortness of breath, bowel changes.

## 2021-01-15 NOTE — PROGRESS NOTE ADULT - ASSESSMENT
50 with hx of HTN, thyroid ca, cerebral aneurysm repair, partial thyroidectomy, tongue ca, umbilical hernia repair consulted for kidney stone.     WBC=9.36, BUN/CR=29/2.3, T-max=99.2  CT A/P: Showed; Obstructing right-sided ureteral calculus (3 x 3 x 3 mm (500 HU)) at the level of the right ureterovesicular junction, resulting in mild right hydroureteronephrosis.    Offered pt cytoscopy and stent placement for obstructing stone and CRISTIN.  Pt would like to continue with conservative management of his kidney stone and CRISTIN.   Will follow up on labs today.  D/w Dr Calhoun whom will follow up. 50 with hx of HTN, thyroid ca, cerebral aneurysm repair, partial thyroidectomy, tongue ca, umbilical hernia repair consulted for kidney stone.     WBC=9.36, BUN/CR=29/2.3, T-max=99.2  CT A/P: Showed; Obstructing right-sided ureteral calculus (3 x 3 x 3 mm (500 HU)) at the level of the right ureterovesicular junction, resulting in mild right hydroureteronephrosis.    Offered pt cytoscopy and stent placement for obstructing stone and CRISTIN.  Pt would like to continue with conservative management of his kidney stone and CRISTIN.   Will follow up on labs today.  D/w Dr Calhoun seen and examined pt. Pt urologically cleared for dc home. Pt will have a r/b sono on Monday or Tuesday next week then see Dr Calhoun on Thursday. Will repeat bmp at office. D/w Medical team.

## 2021-01-15 NOTE — ED PROVIDER NOTE - ATTENDING CONTRIBUTION TO CARE
49 yo M pmh as stated in chart pw R flank pain. Flank pain x3 days, radiating towards groin. Intermittent, moderate. Associated with urinary urgency and nausea. No vomiting, no cp, no sob, no abd pain, no diarrhea, no dysuria, no hematuria.     CONSTITUTIONAL: Well-developed; well-nourished; in no acute distress. Sitting up and providing appropriate history and physical examination  SKIN: skin exam is warm and dry, no acute rash.  HEAD: Normocephalic; atraumatic.  EYES: PERRL, 3 mm bilateral, no nystagmus, EOM intact; conjunctiva and sclera clear.  ENT: No nasal discharge; airway clear.  NECK: Supple; non tender. + full passive ROM in all directions. No JVD  CARD: S1, S2 normal; no murmurs, gallops, or rubs. Regular rate and rhythm. + Symmetric Strong Pulses  RESP: No wheezes, rales or rhonchi. Good air movement bilaterally  ABD: +R cva tenderness, +RLQ abd pain. soft; non-distended. No Rebound, No Guarding, No signs of peritonitis. No pulsatile abdominal mass. + Strong and Symmetric Pulses  EXT: Normal ROM. No clubbing, cyanosis or edema. Dp and Pt Pulses intact. Cap refill less than 3 seconds  NEURO: CN 2-12 intact, normal finger to nose, normal romberg, stable gait, no sensory or motor deficits, Alert, oriented, grossly unremarkable. No Focal deficits. GCS 15. NIH 0  PSYCH: Cooperative, appropriate.

## 2021-01-15 NOTE — ED PROVIDER NOTE - PHYSICAL EXAMINATION
CONSTITUTIONAL: Well-developed; well-nourished; in no acute distress, nontoxic appearing  SKIN: skin exam is warm and dry,  HEAD: Normocephalic; atraumatic.  ENT: MMM, no nasal congestion  NECK: Supple; non tender.  ROM intact.  CARD: S1, S2 normal, no murmur  RESP: No wheezes, rales or rhonchi. Good air movement bilaterally  ABD: +R CVAT, abd soft, non-distended  EXT: Normal ROM.   NEURO: awake, alert, following commands, oriented, grossly unremarkable. No Focal deficits. GCS 15.   PSYCH: Cooperative, appropriate.

## 2021-01-15 NOTE — H&P ADULT - NSHPSOCIALHISTORY_GEN_ALL_CORE
Marital Status:  (   )    (   ) Single    (   )    (  )   Lives with: (  ) alone  (  ) children   (  ) spouse   (  ) parents  (  ) other  Recent Travel: No recent travel  Occupation:    Substance Use (street drugs): ( x ) never used  (  ) other:  Tobacco Usage:  ( x  ) never smoked   (   ) former smoker   (   ) current smoker  (     ) pack year  Alcohol Usage: None Substance Use (street drugs): ( x ) never used  (  ) other:  Tobacco Usage:  ( x  ) never smoked   (   ) former smoker   (   ) current smoker  (     ) pack year  Alcohol Usage: None

## 2021-01-15 NOTE — DISCHARGE NOTE NURSING/CASE MANAGEMENT/SOCIAL WORK - PATIENT PORTAL LINK FT
You can access the FollowMyHealth Patient Portal offered by [facility] by registering at the following website: [email or web address]. By joining [facility]’s FollowMyHealth portal, you will also be able to view your health information using other applications (apps) compatible with our system.

## 2021-01-15 NOTE — DISCHARGE NOTE PROVIDER - NSDCFUADDAPPT_GEN_ALL_CORE_FT
-- Set up appt with PCP Dr. Charly Max in 1 week  -- Will have Follow up appt with Urologist Dr. Calhoun in 1 week with labs in clinic and Renal ultrasound to be done 1/18/2021

## 2021-01-15 NOTE — ED PROVIDER NOTE - NS ED ATTENDING STATEMENT MOD
normal (ped)...
I have personally performed a face to face diagnostic evaluation on this patient. I have reviewed the ACP note and agree with the history, exam and plan of care, except as noted.

## 2021-01-15 NOTE — H&P ADULT - HISTORY OF PRESENT ILLNESS
50 with hx of HTN, thyroid Ca s/p thyroidectomy, tongue Ca, cerebral aneurysm repair presents to the ED for right flank pain x 2 days. As per patient, right flank pain was intermittent but got worse last night, 10/10, radiating to the groin associated with nausea but no vomiting. Denies fever/chills, cough, CP, SOB and palpitations.  50 with hx of HTN, thyroid Ca s/p thyroidectomy, tongue Ca, cerebral aneurysm repair presents to the ED for right flank pain x 3 days. As per patient, right flank pain was intermittent but got worse last night, 10/10, radiating to the groin associated with nausea but no vomiting. Denies fever/chills, cough, CP, SOB and palpitations.

## 2021-01-15 NOTE — PROGRESS NOTE ADULT - SUBJECTIVE AND OBJECTIVE BOX
HPI:  Pt denies abd pain, n/v.      PAST MEDICAL & SURGICAL HISTORY:  H/O cerebral aneurysm repair  2019    Hypothyroidism    Thyroid cancer  2015    Tongue cancer  2015( last chemo/ radiation 2015)    HTN (hypertension)    Surgical history unknown  tumor removal/tongue/cavernoma brain removed    H/O partial thyroidectomy    S/P cerebral aneurysm repair        Allergies    No Known Allergies      MEDICATIONS  (STANDING):  heparin   Injectable 5000 Unit(s) SubCutaneous every 12 hours  labetalol 100 milliGRAM(s) Oral two times a day  levothyroxine 50 MICROGram(s) Oral daily  sodium chloride 0.9%. 1000 milliLiter(s) (100 mL/Hr) IV Continuous <Continuous>  tamsulosin 0.4 milliGRAM(s) Oral at bedtime    MEDICATIONS  (PRN):  oxycodone    5 mG/acetaminophen 325 mG 1 Tablet(s) Oral every 6 hours PRN Moderate Pain (4 - 6)      ROS:  General:	no fever, weight loss,  chills  Skin: no rash, ulcers  Respiratory and Thorax: no cough, wheeze,  sob  Cardiovascular:	no chest pain, palpitations, dizziness  Gastrointestinal:	, +abd pain  Genitourinary:	no dysuria, hematuria  Musculoskeletal:	no joint pains  Neurological:	 no speech disturbance, focal weakness, numbness          Vital Signs Last 24 Hrs  T(F): 97 (15 Kameron 2021 06:26), Max: 99.2 (14 Jan 2021 22:15)  HR: 87 (15 Kameron 2021 06:26) (77 - 106)  BP: 148/99 (15 Kameron 2021 06:26) (148/99 - 190/104)  RR: 16 (15 Kameron 2021 06:26) (16 - 17)  SpO2: 100% (15 Kameron 2021 03:22) (97% - 100%)    PHYSICAL EXAM:      Constitutional: A&Ox4, NAD    Gastrointestinal: soft nt  nd + bs no rebound or guarding  Genitourinary: no cva tenderness  Extremities: normal rom, no edema, calf tenderness

## 2021-01-15 NOTE — CONSULT NOTE ADULT - PROBLEM SELECTOR RECOMMENDATION 9
-Discussed with Dr. Calhoun   -IV hydration  -Repeat BMP, if kidney function improves, D/C home and follow up in office,  -Plan discussed with ED -Discussed with Dr. Calhoun   -IV hydration  -NPO for OR   -Repeat BMP after hydration  -Pain mgmt  -GI and DVT prophylaxis

## 2021-01-15 NOTE — H&P ADULT - ATTENDING COMMENTS
50 with hx of HTN, thyroid Ca s/p thyroidectomy, tongue Ca, cerebral aneurysm repair presents to the ED for right flank pain x 2 days.    # Right hydroureteronephrosis secondary to right-sided ureteral calculus  - hemodynamically stable  - CT Abd: Obstructing right-sided ureteral calculus (3 x 3 x 3 mm) at the level of the right ureterovesical junction, resulting in mild right hydroureteronephrosis.  - start Flomax   - NS@100  - NPO for now   - Urology following    # HTN  - elevated likely secondary to pain   - c/w     # s/p partial thyroidectomy  - c/w Levothyroxine     # DVT ppx  - start heparin     # Ambulate as tolerated    # Full code 50 with hx of HTN, thyroid Ca s/p thyroidectomy, cerebral aneurysm repair presents to the ED for right flank pain x 2 days.    # Right hydroureteronephrosis secondary to right-sided ureteral calculus  - hemodynamically stable  - CT Abd: Obstructing right-sided ureteral calculus (3 x 3 x 3 mm) at the level of the right ureterovesical junction, resulting in mild right hydroureteronephrosis.  - start Flomax   - NS@100  - NPO for now   - Urology following    # HTN  - elevated likely secondary to pain   - c/w Labetalol     # s/p partial thyroidectomy  - c/w Levothyroxine     # DVT ppx  - start heparin     # Ambulate as tolerated    # Prepare for discharge    # Full code 50 with hx of HTN, thyroid Ca s/p thyroidectomy, cerebral aneurysm repair presents to the ED for right flank pain x 3 days.    # Right hydroureteronephrosis secondary to right-sided ureteral calculus  - hemodynamically stable  - CT Abd: Obstructing right-sided ureteral calculus (3 x 3 x 3 mm) at the level of the right ureterovesical junction, resulting in mild right hydroureteronephrosis.  - start Flomax   - NS@100  - Urology following    # HTN  - elevated likely secondary to pain   - c/w Labetalol     # s/p partial thyroidectomy  - c/w Levothyroxine     # DVT ppx  - start heparin     # DASH diet    # Ambulate as tolerated    # Prepare for discharge    # Full code

## 2021-01-15 NOTE — H&P ADULT - NSHPPHYSICALEXAM_GEN_ALL_CORE
T(C): 37.3 (01-14-21 @ 22:15), Max: 37.3 (01-14-21 @ 22:10)  HR: 93 (01-14-21 @ 22:15) (93 - 106)  BP: 180/104 (01-14-21 @ 22:15) (180/104 - 190/104)  RR: 17 (01-14-21 @ 22:15) (16 - 17)  SpO2: 99% (01-14-21 @ 22:15) (97% - 99%)        GENERAL: NAD, well-developed  HEAD:  Atraumatic, Normocephalic  EYES: EOMI, PERRLA, conjunctiva and sclera clear  ENT: Normal tympanic membrane. No nasal obstruction or discharge. No tonsillar exudate, swelling or erythema.  NECK: Supple, No JVD  CHEST/LUNG: Clear to auscultation bilaterally; No wheeze  HEART: Regular rate and rhythm; No murmurs, rubs, or gallops  ABDOMEN: Soft, right flank tenderness, Nondistended; Bowel sounds present  EXTREMITIES:  2+ Peripheral Pulses, No clubbing, cyanosis, or edema  PSYCH: AAOx3  NEUROLOGY: non-focal  SKIN: No rashes or lesions T(C): 37.3 (01-14-21 @ 22:15), Max: 37.3 (01-14-21 @ 22:10)  HR: 93 (01-14-21 @ 22:15) (93 - 106)  BP: 180/104 (01-14-21 @ 22:15) (180/104 - 190/104)  RR: 17 (01-14-21 @ 22:15) (16 - 17)  SpO2: 99% (01-14-21 @ 22:15) (97% - 99%)    GENERAL: NAD, well-developed  HEAD:  Atraumatic, Normocephalic  EYES: EOMI, PERRLA, conjunctiva and sclera clear  ENT: Normal tympanic membrane. No nasal obstruction or discharge. No tonsillar exudate, swelling or erythema.  NECK: Supple, No JVD  CHEST/LUNG: Clear to auscultation bilaterally; No wheeze  HEART: Regular rate and rhythm; No murmurs, rubs, or gallops  ABDOMEN: Soft, right flank tenderness, Nondistended; Bowel sounds present  EXTREMITIES:  2+ Peripheral Pulses, No clubbing, cyanosis, or edema  PSYCH: AAOx3  NEUROLOGY: non-focal  SKIN: No rashes or lesions

## 2021-01-15 NOTE — H&P ADULT - NSICDXPASTMEDICALHX_GEN_ALL_CORE_FT
PAST MEDICAL HISTORY:  H/O cerebral aneurysm repair 2019    HTN (hypertension)     Hypothyroidism     Thyroid cancer 2015    Tongue cancer 2015( last chemo/ radiation 2015)

## 2021-01-15 NOTE — CONSULT NOTE ADULT - SUBJECTIVE AND OBJECTIVE BOX
50 with hx of HTN, thyroid ca, cerebral aneurysm repair, partial thyroidectomy, tongue ca, umbilical hernia repair presents to the ED for evaluation 2/2 right flank pain x 2 days. Pain was intermittent but got worse last night associated with nausea but no vomiting. Denies fever/chills, cough, CP, SOB and palpitations. Seen and examined, NAD. Pt states, pain prior to Hospital visit was 10/10 on scale but has gotten better, pain at the time of visit was 1/10 on scale.     WBC=9.36, BUN/CR=29/2.3, T-max=99.2  CT A/P: Showed; Obstructing right-sided ureteral calculus (3 x 3 x 3 mm (500 HU)) at the level of the right ureterovesicular junction, resulting in mild right hydroureteronephrosis.    Vital Signs Last 24 Hrs  T(C): 37.3 (14 Jan 2021 22:15), Max: 37.3 (14 Jan 2021 22:10)  T(F): 99.2 (14 Jan 2021 22:15), Max: 99.2 (14 Jan 2021 22:15)  HR: 93 (14 Jan 2021 22:15) (93 - 106)  BP: 180/104 (14 Jan 2021 22:15) (180/104 - 190/104)  BP(mean): --  RR: 17 (14 Jan 2021 22:15) (16 - 17)  SpO2: 99% (14 Jan 2021 22:15) (97% - 99%)     Past Medical History:  H/O cerebral aneurysm repair  2019  HTN (hypertension)    Hypothyroidism    Thyroid cancer  2015  Tongue cancer  2015( last chemo/ radiation 2015).     Past Surgical History:  H/O partial thyroidectomy    S/P cerebral aneurysm repair   Umbilical hernia repair       PHYSICAL EXAM:      Constitutional: NAD, A&O x3    Eyes: PERRLA, no conjuctivitis    Neck: no lymphadenopathy    Respiratory: +air entry, no rales, no rhonchi, no wheezes    Cardiovascular: +S1 and S2, regular rate and rhythm    Gastrointestinal: +BS, soft, non-tender, no CVAT,  not distended    Extremities:  no edema, no calf tenderness    Neurological: sensation intact, ROM equal B/L, CN II-XII intact    Skin: no rashes, normal turgor                              13.6   9.36  )-----------( 314      ( 14 Jan 2021 22:48 )             41.8     01-14    138  |  101  |  29<H>  ----------------------------<  124<H>  4.2   |  25  |  2.3<H>    Ca    9.1      14 Jan 2021 22:48    TPro  6.8  /  Alb  4.3  /  TBili  0.3  /  DBili  <0.2  /  AST  22  /  ALT  36  /  AlkPhos  101  01-14          Urinalysis Basic - ( 14 Jan 2021 22:48 )    Color: Yellow / Appearance: Clear / SG: >=1.030 / pH: x  Gluc: x / Ketone: Negative  / Bili: Negative / Urobili: 0.2 mg/dL   Blood: x / Protein: 100 mg/dL / Nitrite: Negative   Leuk Esterase: Negative / RBC: 3-5 /HPF / WBC 1-2 /HPF   Sq Epi: x / Non Sq Epi: Few /HPF / Bacteria: Occasional /HPF            < from: CT Abdomen and Pelvis w/ IV Cont (01.15.21 @ 00:32) >    IMPRESSION:    Obstructing right-sided ureteral calculus (3 x 3 x 3 mm (500 HU)) at the level of the right ureterovesicular junction, resulting in mild right hydroureteronephrosis.      < end of copied text >                         50 with hx of HTN, thyroid ca, cerebral aneurysm repair, partial thyroidectomy, tongue ca, umbilical hernia repair presents to the ED for evaluation 2/2 right flank pain x 2 days. Pain was intermittent but got worse last night associated with nausea but no vomiting. Denies fever/chills, cough, CP, SOB and palpitations. Seen and examined, NAD. Pt states, pain prior to Hospital visit was 10/10 on scale but has gotten better, pain at the time of visit was 1/10 on scale.     WBC=9.36, BUN/CR=29/2.3, T-max=99.2  CT A/P: Showed; Obstructing right-sided ureteral calculus (3 x 3 x 3 mm (500 HU)) at the level of the right ureterovesicular junction, resulting in mild right hydroureteronephrosis.    Vital Signs Last 24 Hrs  T(C): 37.3 (14 Jan 2021 22:15), Max: 37.3 (14 Jan 2021 22:10)  T(F): 99.2 (14 Jan 2021 22:15), Max: 99.2 (14 Jan 2021 22:15)  HR: 93 (14 Jan 2021 22:15) (93 - 106)  BP: 180/104 (14 Jan 2021 22:15) (180/104 - 190/104)  BP(mean): --  RR: 17 (14 Jan 2021 22:15) (16 - 17)  SpO2: 99% (14 Jan 2021 22:15) (97% - 99%)     Past Medical History:  H/O cerebral aneurysm repair  2019  HTN (hypertension)    Hypothyroidism    Thyroid cancer  2015  Tongue cancer  2015( last chemo/ radiation 2015).     Past Surgical History:  H/O partial thyroidectomy    S/P cerebral aneurysm repair   Umbilical hernia repair     Social History: No recreational drugs    Family History: No urologic family history    Review of Systems  Genitourinary: see HPI  Constitutional, Eyes, ENT, Cardiovascular, Respiratory, Gastrointestinal, Musculoskeletal, Integumentary and Neurological are otherwise negative.      PHYSICAL EXAM:      Constitutional: NAD, A&O x3    Eyes: PERRLA, no conjuctivitis    Neck: no lymphadenopathy    Respiratory: +air entry, no rales, no rhonchi, no wheezes    Cardiovascular: +S1 and S2, regular rate and rhythm    Gastrointestinal: +BS, soft, non-tender, no CVAT,  not distended    Extremities:  no edema, no calf tenderness    Neurological: sensation intact, ROM equal B/L, CN II-XII intact    Skin: no rashes, normal turgor                              13.6   9.36  )-----------( 314      ( 14 Jan 2021 22:48 )             41.8     01-14    138  |  101  |  29<H>  ----------------------------<  124<H>  4.2   |  25  |  2.3<H>    Ca    9.1      14 Jan 2021 22:48    TPro  6.8  /  Alb  4.3  /  TBili  0.3  /  DBili  <0.2  /  AST  22  /  ALT  36  /  AlkPhos  101  01-14          Urinalysis Basic - ( 14 Jan 2021 22:48 )    Color: Yellow / Appearance: Clear / SG: >=1.030 / pH: x  Gluc: x / Ketone: Negative  / Bili: Negative / Urobili: 0.2 mg/dL   Blood: x / Protein: 100 mg/dL / Nitrite: Negative   Leuk Esterase: Negative / RBC: 3-5 /HPF / WBC 1-2 /HPF   Sq Epi: x / Non Sq Epi: Few /HPF / Bacteria: Occasional /HPF            < from: CT Abdomen and Pelvis w/ IV Cont (01.15.21 @ 00:32) >    IMPRESSION:    Obstructing right-sided ureteral calculus (3 x 3 x 3 mm (500 HU)) at the level of the right ureterovesicular junction, resulting in mild right hydroureteronephrosis.      < end of copied text >

## 2021-01-16 LAB
CULTURE RESULTS: NO GROWTH — SIGNIFICANT CHANGE UP
SPECIMEN SOURCE: SIGNIFICANT CHANGE UP

## 2021-01-25 DIAGNOSIS — Z85.850 PERSONAL HISTORY OF MALIGNANT NEOPLASM OF THYROID: ICD-10-CM

## 2021-01-25 DIAGNOSIS — M54.5 LOW BACK PAIN: ICD-10-CM

## 2021-01-25 DIAGNOSIS — N13.2 HYDRONEPHROSIS WITH RENAL AND URETERAL CALCULOUS OBSTRUCTION: ICD-10-CM

## 2021-01-25 DIAGNOSIS — Z86.79 PERSONAL HISTORY OF OTHER DISEASES OF THE CIRCULATORY SYSTEM: ICD-10-CM

## 2021-01-25 DIAGNOSIS — Z92.21 PERSONAL HISTORY OF ANTINEOPLASTIC CHEMOTHERAPY: ICD-10-CM

## 2021-01-25 DIAGNOSIS — Z86.011 PERSONAL HISTORY OF BENIGN NEOPLASM OF THE BRAIN: ICD-10-CM

## 2021-01-25 DIAGNOSIS — Z85.810 PERSONAL HISTORY OF MALIGNANT NEOPLASM OF TONGUE: ICD-10-CM

## 2021-01-25 DIAGNOSIS — E89.0 POSTPROCEDURAL HYPOTHYROIDISM: ICD-10-CM

## 2021-01-25 DIAGNOSIS — Z92.3 PERSONAL HISTORY OF IRRADIATION: ICD-10-CM

## 2021-01-25 DIAGNOSIS — N17.9 ACUTE KIDNEY FAILURE, UNSPECIFIED: ICD-10-CM

## 2021-01-25 DIAGNOSIS — I10 ESSENTIAL (PRIMARY) HYPERTENSION: ICD-10-CM

## 2021-10-28 NOTE — ED ADULT TRIAGE NOTE - BP NONINVASIVE DIASTOLIC (MM HG)
Reviewed discharge instructions with pt. Reviewed with patient to take BP daily at home. If BP >160/105 call MD.  Informed pt to call her pediatrician before being with  for Covid policy. Pt agreed and verbalized understanding. Pt taken to car via wheelchair in stable condition. 104

## 2022-08-16 ENCOUNTER — APPOINTMENT (OUTPATIENT)
Dept: OTOLARYNGOLOGY | Facility: CLINIC | Age: 52
End: 2022-08-16

## 2022-08-16 VITALS
HEART RATE: 95 BPM | WEIGHT: 222 LBS | OXYGEN SATURATION: 98 % | BODY MASS INDEX: 35.68 KG/M2 | HEIGHT: 66 IN | TEMPERATURE: 97.2 F

## 2022-08-16 DIAGNOSIS — J33.0 POLYP OF NASAL CAVITY: ICD-10-CM

## 2022-08-16 DIAGNOSIS — H91.90 UNSPECIFIED HEARING LOSS, UNSPECIFIED EAR: ICD-10-CM

## 2022-08-16 DIAGNOSIS — R22.1 LOCALIZED SWELLING, MASS AND LUMP, NECK: ICD-10-CM

## 2022-08-16 PROCEDURE — 99204 OFFICE O/P NEW MOD 45 MIN: CPT | Mod: 25

## 2022-08-16 PROCEDURE — 31575 DIAGNOSTIC LARYNGOSCOPY: CPT

## 2022-08-16 RX ORDER — FLUTICASONE PROPIONATE 50 UG/1
50 SPRAY, METERED NASAL
Qty: 16 | Refills: 2 | Status: ACTIVE | COMMUNITY
Start: 2022-08-16 | End: 1900-01-01

## 2022-08-16 NOTE — PHYSICAL EXAM
[Midline] : trachea located in midline position [Laryngoscopy Performed] : laryngoscopy was performed, see procedure section for findings [de-identified] : left nasal polyp [Normal] : no neck adenopathy [de-identified] : Post neck dissection [de-identified] : No cervical adenopathy noted

## 2022-08-16 NOTE — PROCEDURE
[de-identified] : Procedure performed: Fiberoptic Laryngeal Endoscopy - Diagnostic\par After informed verbal consent topical neosynephrine and lidocaine were applied, the fiberoptic nasal endoscope was\par passed via the R/L nasal cavity without incident. The patient tolerated the procedure quite well.\par The nasal cavity was normal without evidence of masses, polyps, lesions or drainage. The nasal septum and turbinates are within normal limits on the right as well as left, there is a left-sided nasal polyp see below\par Nasopharynx was normal without lesions or masses. Eustachian tube orifice normal bilaterals. Oropharynx and Hypopharyngeal mucosa are within normal limits without lesions masses, ulcerations or concerning findings. True and false vocal folds are within normal limits, normal sensory and motor examination. 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

## 2022-08-16 NOTE — HISTORY OF PRESENT ILLNESS
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  Has done well with no evidence of disease since this time mild tumor related changes including xerostomia recently noticed area of irritation on the right lateral tongue and wished me to evaluate this area.  Of note he also has a thyroid nodule which we are following and he is due for his annual ultrasound.   [FreeTextEntry1] : He has no significant plaints from the oncologic standpoint but did complain of some left nasal airway obstruction and wished this evaluated as well

## 2023-06-30 NOTE — DATA REVIEWED
[No studies available for review at this time.] : No studies available for review at this time. [Right] : right knee [NL (140)] : flexion 140 degrees [NL (0)] : extension 0 degrees [] : no erythema

## 2023-08-08 ENCOUNTER — APPOINTMENT (OUTPATIENT)
Dept: OTOLARYNGOLOGY | Facility: CLINIC | Age: 53
End: 2023-08-08

## 2023-08-25 PROBLEM — R09.81 NASAL CONGESTION: Status: ACTIVE | Noted: 2022-08-16

## 2023-08-29 ENCOUNTER — APPOINTMENT (OUTPATIENT)
Dept: OTOLARYNGOLOGY | Facility: CLINIC | Age: 53
End: 2023-08-29

## 2023-08-29 DIAGNOSIS — R09.81 NASAL CONGESTION: ICD-10-CM

## 2023-08-30 NOTE — HISTORY OF PRESENT ILLNESS
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 He has had no evidence of disease since that time. [FreeTextEntry1] : 8/14/2023: Has done well with no evidence of disease since this time. He has some mild tumor related changes including xerostomia recently noticed area of irritation on the right lateral tongue and wished me to evaluate this area. Of note he also has a thyroid nodule which we are following and he is due for his annual ultrasound. Also complaining of left nasal airway obstruction and wishes to have this evaluated.  Otherwise from an oncologic standpoint has no complaints.  8/29/2023: Patient here today for interval follow up appointment.

## 2023-09-18 PROBLEM — E04.1 THYROID NODULE: Status: ACTIVE | Noted: 2022-08-16

## 2023-09-19 ENCOUNTER — APPOINTMENT (OUTPATIENT)
Dept: OTOLARYNGOLOGY | Facility: CLINIC | Age: 53
End: 2023-09-19

## 2023-09-19 DIAGNOSIS — E04.1 NONTOXIC SINGLE THYROID NODULE: ICD-10-CM

## 2024-11-20 NOTE — ASU DISCHARGE PLAN (ADULT/PEDIATRIC) - ASU DC SPECIAL INSTRUCTIONSFT
Diet: Continue your regular diet.  Dressings: Leave dressing in place until you see Dr. Keith in clinic. You may shower with it on, it's waterproof.  Pain: Take Percocet every 6-8 hours as needed for pain. You may alternate with Motrin 600mg.   Activity: Avoid heavy lifting (anything over 10 pounds) for at least 6 weeks.   Follow up: Call to schedule a follow up appointment in 2 weeks, [phone].
Performed

## 2025-01-09 NOTE — H&P ADULT - NSHPPOADEEPVENOUSTHROMB_GEN_A_CORE
no S/p Right Hip IM Nailing POD#5  Ortho following  Pain management  Dressing changed today from Medipore tape to 4x4 and Tegaderm   DVT prophylaxis with venodynes and Eliquis per medicine, no orthopedic contraindications  Daily Physical Therapy:  WBAT of the Right lower extremity with appropriate assistive device   Discharge planning [facility] vs return to prior facility.